# Patient Record
Sex: FEMALE | Race: WHITE | Employment: OTHER | URBAN - METROPOLITAN AREA
[De-identification: names, ages, dates, MRNs, and addresses within clinical notes are randomized per-mention and may not be internally consistent; named-entity substitution may affect disease eponyms.]

---

## 2017-03-08 ENCOUNTER — OP HISTORICAL/CONVERTED ENCOUNTER (OUTPATIENT)
Dept: OTHER | Age: 82
End: 2017-03-08

## 2017-03-14 ENCOUNTER — OP HISTORICAL/CONVERTED ENCOUNTER (OUTPATIENT)
Dept: OTHER | Age: 82
End: 2017-03-14

## 2017-04-01 ENCOUNTER — OP HISTORICAL/CONVERTED ENCOUNTER (OUTPATIENT)
Dept: OTHER | Age: 82
End: 2017-04-01

## 2017-06-08 ENCOUNTER — OFFICE VISIT (OUTPATIENT)
Dept: NEUROLOGY | Age: 82
End: 2017-06-08

## 2017-06-08 VITALS
HEART RATE: 75 BPM | DIASTOLIC BLOOD PRESSURE: 61 MMHG | RESPIRATION RATE: 18 BRPM | TEMPERATURE: 98.6 F | WEIGHT: 161 LBS | SYSTOLIC BLOOD PRESSURE: 117 MMHG | BODY MASS INDEX: 28.53 KG/M2 | OXYGEN SATURATION: 94 % | HEIGHT: 63 IN

## 2017-06-08 DIAGNOSIS — G20 PARKINSON DISEASE (HCC): Primary | ICD-10-CM

## 2017-06-08 NOTE — PROGRESS NOTES
Reviewed record in preparation for visit and have necessary documentation  Pt did not bring medication to office visit for review  Information was given to pt on Advanced Directives, Living Will  opportunity was given for questionsc

## 2017-06-08 NOTE — PROGRESS NOTES
Neurology Consult      Subjective:      Janice Elizabeth is a 80 y.o. female who comes in with idiopathic Parkinson's disease. Has been on a mostly regular schedule of immediate release Sinemet 25/100 1-1/2 4 times a day. If her activity level demands it such as yard work, she will go to 2 tablets in anticipation of the same. Has become aware of her dyskinesias which have been previously mentioned on revisit. Suggested that this was due to the chemistry of the Sinemet when there is relatively too much dopamine around. We could either restrict the dose or add a drug to suppress it such as amantadine. Patient wants to think about the addition of a new drug such as amantadine and not commit to it on this visit. There is a newer drug that has been approved specifically for tardive dyskinesia called valbenazine. Cognition is good and is not using any durable medical equipment. Likes to stay busy in the yard and has had no falls. Has had routinely good checkup since her mastectomy [facility] last December. Revisit 6 months. I briefly mentioned about a stationary bike that she could acquire to keep her legs busy. Did not have any initial enthusiasm about it but will think about it. Otherwise taking an early morning walk and getting sun exposure weather permitting would be a good idea. Current Outpatient Prescriptions   Medication Sig Dispense Refill    raNITIdine (ZANTAC) 150 mg tablet Take 150 mg by mouth two (2) times a day.  ergocalciferol (VITAMIN D2) 50,000 unit capsule Take 50,000 Units by mouth.  carbidopa-levodopa (SINEMET)  mg per tablet 1 and 1/2 po qid 540 Tab 1    aspirin 81 mg tablet Take 81 mg by mouth.           No Known Allergies  Past Medical History:   Diagnosis Date    Arthritis     Asthma     Breast cancer ([facility] [address]       Past Surgical History:   Procedure Laterality Date    HX HYSTERECTOMY      HX KNEE REPLACEMENT      HX MASTECTOMY        Social History Social History    Marital status:      Spouse name: N/A    Number of children: N/A    Years of education: N/A     Occupational History    Not on file. Social History Main Topics    Smoking status: Never Smoker    Smokeless tobacco: Not on file    Alcohol use No    Drug use: Not on file    Sexual activity: Not on file     Other Topics Concern    Not on file     Social History Narrative      Family History   Problem Relation Age of Onset    Cancer Father       Visit Vitals    /61    Pulse 75    Temp 98.6 °F (37 °C) (Oral)    Resp 18    Ht 5' 3\" (1.6 m)    Wt 73 kg (161 lb)    SpO2 94%    BMI 28.52 kg/m2        Review of Systems:   A comprehensive review of systems was negative except for that written in the HPI. Neuro Exam:     Appearance: The patient is well developed, well nourished, provides a coherent history and is in no acute distress. Mental Status: Oriented to time, place and person. Mood and affect appropriate. Cranial Nerves:   Intact visual fields. Fundi are benign. ANITA, EOM's full, no nystagmus, no ptosis. Facial sensation is normal. Corneal reflexes are intact. Facial movement is symmetric. Hearing is normal bilaterally. Palate is midline with normal sternocleidomastoid and trapezius muscles are normal. Tongue is midline. Motor:  5/5 strength in upper and lower proximal and distal muscles. Normal bulk and tone. No fasciculations. Reflexes:   Deep tendon reflexes 2+/4 and symmetrical.   Sensory:   Normal to touch, pinprick and vibration. Gait:   has reasonable transfers and slightly diminished left arm swing appeared to right. Tremor:   No tremor noted. But once again demonstrates random dyskinesias about her limbs upper and lower. Cerebellar:  No cerebellar signs present. Neurovascular:  Normal heart sounds and regular rhythm, peripheral pulses intact, and no carotid bruits. Slight rigidity in the left arm compared to right.   No true bradykinesia seen today. Assessment:   Idiopathic Parkinson's disease. Once again spot dyskinesias which is the feature of the Sinemet. Talked about adding amantadine to check the dyskinesia but patient wants to observe first.  There is also a newer drug called valbenazine which could be considered. Will consider going to to Sinemet every 4 hours to improve her on time especially when she anticipates doing yard work. Has had good checkups on her postmastectomy screens. Revisit 6 months. Plan:   Revisit 6 months.   Signed by :  Bronson Babcock MD

## 2017-06-08 NOTE — MR AVS SNAPSHOT
Visit Information Date & Time Provider Department Dept. Phone Encounter #  
 6/8/2017  9:40 AM Belgica Brannon MD [address] [place] [address] Neurology Clinic [phone] [id number] Follow-up Instructions Return in about 6 months (around 12/8/2017). Follow-up and Disposition History Upcoming Health Maintenance Date Due DTaP/Tdap/Td series (1 - Tdap) 10/14/1954 ZOSTER VACCINE AGE 60> 10/14/1993 GLAUCOMA SCREENING Q2Y 10/14/1998 OSTEOPOROSIS SCREENING (DEXA) 10/14/1998 Pneumococcal 65+ Low/Medium Risk (1 of 2 - PCV13) 10/14/1998 MEDICARE YEARLY EXAM 10/14/1998 INFLUENZA AGE 9 TO ADULT 8/1/2017 Allergies as of 6/8/2017  Review Complete On: 6/8/2017 By: Belgica Brannon MD  
 No Known Allergies Current Immunizations  Never Reviewed No immunizations on file. Not reviewed this visit You Were Diagnosed With   
  
 Codes Comments [facility] ([facility]    -  Primary ICD-10-CM: G20 
ICD-9-CM: 332.0 Vitals BP Pulse Temp Resp Height(growth percentile) Weight(growth percentile)  
 117/61 75 98.6 °F (37 °C) (Oral) 18 5' 3\" (1.6 m) 161 lb (73 kg) SpO2 BMI OB Status Smoking Status 94% 28.52 kg/m2 Menopause Never Smoker Vitals History BMI and BSA Data Body Mass Index Body Surface Area 28.52 kg/m 2 1.8 m 2 Your Updated Medication List  
  
   
This list is accurate as of: 6/8/17 10:18 AM.  Always use your most recent med list.  
  
  
  
  
 aspirin 81 mg tablet Take 81 mg by mouth.  
  
 carbidopa-levodopa  mg per tablet Commonly known as:  SINEMET  
1 and 1/2 po qid VITAMIN D2 50,000 unit capsule Generic drug:  ergocalciferol Take 50,000 Units by mouth. ZANTAC 150 mg tablet Generic drug:  raNITIdine Take 150 mg by mouth two (2) times a day. Follow-up Instructions Return in about 6 months (around 12/8/2017). Patient Instructions Larisa Terrell 17[address] What is a living will? A living will is a legal form you use to write down the kind of care you want at the end of your life. It is used by the health professionals who will treat you if you aren't able to decide for yourself. If you put your wishes in writing, your loved ones and others will know what kind of care you want. They won't need to guess. This can ease your mind and be helpful to others. A living will is not the same as an estate or property will. An estate will explains what you want to happen with your money and property after you die. Is a living will a legal document? A living will is a legal document. Each state has its own laws about living kc. If you move to another state, make sure that your living will is legal in the state where you now live. Or you might use a universal form that has been approved by many states. This kind of form can sometimes be completed and stored online. Your electronic copy will then be available wherever you have a connection to the Internet. In most cases, doctors will respect your wishes even if you have a form from a different state. · You don't need an  to complete a living will. But legal advice can be helpful if your state's laws are unclear, your health history is complicated, or your family can't agree on what should be in your living will. · You can change your living will at any time. Some people find that their wishes about end-of-life care change as their health changes. · In addition to making a living will, think about completing a medical power of  form. This form lets you name the person you want to make end-of-life treatment decisions for you (your \"health care agent\") if you're not able to. Many hospitals and nursing homes will give you the forms you need to complete a living will and a medical power of .  
· Your living will is used only if you can't make or communicate decisions for yourself anymore. If you become able to make decisions again, you can accept or refuse any treatment, no matter what you wrote in your living will. · Your state may offer an online registry. This is a place where you can store your living will online so the doctors and nurses who need to treat you can find it right away. What should you think about when creating a living will? Talk about your end-of-life wishes with your family members and your doctor. Let them know what you want. That way the people making decisions for you won't be surprised by your choices. Think about these questions as you make your living will: · Do you know enough about life support methods that might be used? If not, talk to your doctor so you know what might be done if you can't breathe on your own, your heart stops, or you're unable to swallow. · What things would you still want to be able to do after you receive life-support methods? Would you want to be able to walk? To speak? To eat on your own? To live without the help of machines? · If you have a choice, where do you want to be cared for? In your home? At a hospital or nursing home? · Do you want certain Sikhism practices performed if you become very ill? · If you have a choice at the end of your life, where would you prefer to die? At home? In a hospital or nursing home? Somewhere else? · Would you prefer to be buried or cremated? · Do you want your organs to be donated after you die? What should you do with your living will? · Make sure that your family members and your health care agent have copies of your living will. · Give your doctor a copy of your living will to keep in your medical record. If you have more than one doctor, make sure that each one has a copy. · You may want to put a copy of your living will where it can be easily found. Where can you learn more? Go to http://mychal-lily.info/. Enter B419 in the search box to learn more about \"Learning About Living Josee Venegas. \" Current as of: February 24, 2016 Content Version: 11.2 © 3879-4885 [employer]. Care instructions adapted under license by [employer] (which disclaims liability or warranty for this information). If you have questions about a medical condition or this instruction, always ask your healthcare professional. [address] any warranty or liability for your use of this information. Patient exam shows once again some additional movement about the limbs which is a feature of the Sinemet. Could be challenged with a drug called amantadine but patient wants to observe for now. On the Sinemet could go to a dedicated two every 4 hour schedule. Encouraged to take regular walks especially in the earlier part of the day when sun exposure would be helpful as well. Revisit 6 months. Patient Instructions History Introducing [facility]! [facility] introduces [employer] patient portal. Now you can access parts of your medical record, email your doctor's office, and request medication refills online. 1. In your internet browser, go to [email or web address] 2. Click on the First Time User? Click Here link in the Sign In box. You will see the New Member Sign Up page. 3. Enter your [employer] Access Code exactly as it appears below. You will not need to use this code after youve completed the sign-up process. If you do not sign up before the expiration date, you must request a new code. · [employer] Access Code: [email or web address] Expires: 9/6/2017 10:09 AM 
 
4. Enter the last four digits of your Social Security Number (xxxx) and Date of Birth (mm/dd/yyyy) as indicated and click Submit. You will be taken to the next sign-up page. 5. Create a [employer] ID. This will be your [employer] login ID and cannot be changed, so think of one that is secure and easy to remember. 6. Create a [employer] password. You can change your password at any time. 7. Enter your Password Reset Question and Answer. This can be used at a later time if you forget your password. 8. Enter your e-mail address. You will receive e-mail notification when new information is available in [address] 9. Click Sign Up. You can now view and download portions of your medical record. 10. Click the Download Summary menu link to download a portable copy of your medical information. If you have questions, please visit the Frequently Asked Questions section of the [employer] website. Remember, [employer] is NOT to be used for urgent needs. For medical emergencies, dial 911. Now available from your iPhone and Android! Please provide this summary of care documentation to your next provider. Your primary care clinician is listed as Karen Garner. If you have any questions after today's visit, please call [phone].

## 2017-06-08 NOTE — PATIENT INSTRUCTIONS
Learning About Living Vivien  What is a living will? A living will is a legal form you use to write down the kind of care you want at the end of your life. It is used by the health professionals who will treat you if you aren't able to decide for yourself. If you put your wishes in writing, your loved ones and others will know what kind of care you want. They won't need to guess. This can ease your mind and be helpful to others. A living will is not the same as an estate or property will. An estate will explains what you want to happen with your money and property after you die. Is a living will a legal document? A living will is a legal document. Each state has its own laws about living kc. If you move to another state, make sure that your living will is legal in the state where you now live. Or you might use a universal form that has been approved by many states. This kind of form can sometimes be completed and stored online. Your electronic copy will then be available wherever you have a connection to the Internet. In most cases, doctors will respect your wishes even if you have a form from a different state. · You don't need an  to complete a living will. But legal advice can be helpful if your state's laws are unclear, your health history is complicated, or your family can't agree on what should be in your living will. · You can change your living will at any time. Some people find that their wishes about end-of-life care change as their health changes. · In addition to making a living will, think about completing a medical power of  form. This form lets you name the person you want to make end-of-life treatment decisions for you (your \"health care agent\") if you're not able to. Many hospitals and nursing homes will give you the forms you need to complete a living will and a medical power of .   · Your living will is used only if you can't make or communicate decisions for yourself anymore. If you become able to make decisions again, you can accept or refuse any treatment, no matter what you wrote in your living will. · Your state may offer an online registry. This is a place where you can store your living will online so the doctors and nurses who need to treat you can find it right away. What should you think about when creating a living will? Talk about your end-of-life wishes with your family members and your doctor. Let them know what you want. That way the people making decisions for you won't be surprised by your choices. Think about these questions as you make your living will:  · Do you know enough about life support methods that might be used? If not, talk to your doctor so you know what might be done if you can't breathe on your own, your heart stops, or you're unable to swallow. · What things would you still want to be able to do after you receive life-support methods? Would you want to be able to walk? To speak? To eat on your own? To live without the help of machines? · If you have a choice, where do you want to be cared for? In your home? At a hospital or nursing home? · Do you want certain Roman Catholic practices performed if you become very ill? · If you have a choice at the end of your life, where would you prefer to die? At home? In a hospital or nursing home? Somewhere else? · Would you prefer to be buried or cremated? · Do you want your organs to be donated after you die? What should you do with your living will? · Make sure that your family members and your health care agent have copies of your living will. · Give your doctor a copy of your living will to keep in your medical record. If you have more than one doctor, make sure that each one has a copy. · You may want to put a copy of your living will where it can be easily found. Where can you learn more? Go to http://mychal-lily.info/.   Enter P115 in the search box to learn more about \"Learning About Living Perromedhat. \"  Current as of: February 24, 2016  Content Version: 11.2  © 5622-8135 [employer]. Care instructions adapted under license by [employer] (which disclaims liability or warranty for this information). If you have questions about a medical condition or this instruction, always ask your healthcare professional. [address] any warranty or liability for your use of this information. Patient exam shows once again some additional movement about the limbs which is a feature of the Sinemet. Could be challenged with a drug called amantadine but patient wants to observe for now. On the Sinemet could go to a dedicated two every 4 hour schedule. Encouraged to take regular walks especially in the earlier part of the day when sun exposure would be helpful as well. Revisit 6 months.

## 2017-07-10 ENCOUNTER — TELEPHONE (OUTPATIENT)
Dept: NEUROLOGY | Age: 82
End: 2017-07-10

## 2017-07-10 DIAGNOSIS — G20 PARALYSIS AGITANS (HCC): ICD-10-CM

## 2017-07-10 RX ORDER — CARBIDOPA AND LEVODOPA 25; 100 MG/1; MG/1
TABLET ORAL
Qty: 540 TAB | Refills: 3 | Status: SHIPPED | OUTPATIENT
Start: 2017-07-10 | End: 2018-06-21 | Stop reason: DRUGHIGH

## 2017-12-21 ENCOUNTER — OFFICE VISIT (OUTPATIENT)
Dept: NEUROLOGY | Age: 82
End: 2017-12-21

## 2017-12-21 VITALS
RESPIRATION RATE: 18 BRPM | SYSTOLIC BLOOD PRESSURE: 124 MMHG | DIASTOLIC BLOOD PRESSURE: 80 MMHG | WEIGHT: 160 LBS | TEMPERATURE: 98.2 F | BODY MASS INDEX: 28.35 KG/M2 | OXYGEN SATURATION: 96 % | HEART RATE: 78 BPM | HEIGHT: 63 IN

## 2017-12-21 DIAGNOSIS — G20 PARKINSON DISEASE (HCC): Primary | ICD-10-CM

## 2017-12-21 RX ORDER — CARBIDOPA AND LEVODOPA 25; 100 MG/1; MG/1
TABLET ORAL
Qty: 240 TAB | Refills: 6 | Status: SHIPPED | OUTPATIENT
Start: 2017-12-21 | End: 2018-12-20 | Stop reason: SDUPTHER

## 2017-12-21 NOTE — PROGRESS NOTES
Neurology Consult      Subjective:      Mirela Quigley is a 80 y.o. female who comes in with established parkinson's . On 1 1/2 tabs sinemet IR qid. Will increase to 2 po qid to counter tremors and such. Consider Gocovir for dyskinesias in future. Sleep ok although occasions of waking up and is not sure of basis? Will observe, and I can add sinemet control release qhs if interested. Cognition good and socializes. Gets outside when she can. Not depressed at this point. Exam looked baseline today for me. Has breakthrough dyskinesias of UE noteworthy and not rate limiting at this juncture. Not defaulting to cane etc... Will suggest RV in 6 months and very focused and lively today. Mentioned no new med/surg issues with me. Current Outpatient Prescriptions   Medication Sig Dispense Refill    carbidopa-levodopa (SINEMET)  mg per tablet 2 po qid  Indications: IDIOPATHIC PARKINSONISM 240 Tab 6    carbidopa-levodopa (SINEMET)  mg per tablet 1 and 1/2 po qid 540 Tab 3    raNITIdine (ZANTAC) 150 mg tablet Take 150 mg by mouth two (2) times a day.  aspirin 81 mg tablet Take 81 mg by mouth.  ergocalciferol (VITAMIN D2) 50,000 unit capsule Take 50,000 Units by mouth. No Known Allergies  Past Medical History:   Diagnosis Date    Arthritis     Asthma     Breast cancer ([facility] [address]       Past Surgical History:   Procedure Laterality Date    HX HYSTERECTOMY      HX KNEE REPLACEMENT      HX MASTECTOMY        Social History     Social History    Marital status:      Spouse name: N/A    Number of children: N/A    Years of education: N/A     Occupational History    Not on file.      Social History Main Topics    Smoking status: Never Smoker    Smokeless tobacco: Never Used    Alcohol use No    Drug use: Not on file    Sexual activity: Not on file     Other Topics Concern    Not on file     Social History Narrative      Family History   Problem Relation Age of Onset    Cancer Father       Visit Vitals    /80    Pulse 78    Temp 98.2 °F (36.8 °C) (Oral)    Resp 18    Ht 5' 3\" (1.6 m)    Wt 72.6 kg (160 lb)    SpO2 96%    BMI 28.34 kg/m2        Review of Systems:   A comprehensive review of systems was negative except for that written in the HPI. Neuro Exam:     Appearance: The patient is well developed, well nourished, provides a coherent history and is in no acute distress. Mental Status: Oriented to time, place and person. Mood and affect appropriate. Cranial Nerves:   Intact visual fields. Fundi are benign. ANITA, EOM's full, no nystagmus, no ptosis. Facial sensation is normal. Corneal reflexes are intact. Facial movement is symmetric. Hearing is normal bilaterally. Palate is midline with normal sternocleidomastoid and trapezius muscles are normal. Tongue is midline. Motor:  5/5 strength in upper and lower proximal and distal muscles. Normal bulk and tone. No fasciculations. Reflexes:   Deep tendon reflexes 2+/4 and symmetrical.   Sensory:   Normal to touch, pinprick and vibration. Gait:  Normal gait. Tremor:   Mild rest tremor noted. Cerebellar:  No cerebellar signs present. Neurovascular:  Normal heart sounds and regular rhythm, peripheral pulses intact, and no carotid bruits. Some elements of dyskinesia noteworthy in UE's            Assessment:   parkinsons disease. Will increase sinemet to counter tremors. Consider Gocovir for dyskinesia for future reference. She will let me know if interested in control release sinemet for qhs. Plan:   RV 6 months.   Signed by :  Darshan Cantor MD

## 2017-12-21 NOTE — MR AVS SNAPSHOT
Visit Information Date & Time Provider Department Dept. Phone Encounter #  
 12/21/2017  9:40 AM Syl Andrew MD [address] [place] [address] Neurology Clinic [phone] [id number] Follow-up Instructions Return in about 6 months (around 6/21/2018). Upcoming Health Maintenance Date Due DTaP/Tdap/Td series (1 - Tdap) 10/14/1954 ZOSTER VACCINE AGE 60> 8/14/1993 GLAUCOMA SCREENING Q2Y 10/14/1998 OSTEOPOROSIS SCREENING (DEXA) 10/14/1998 Pneumococcal 65+ Low/Medium Risk (1 of 2 - PCV13) 10/14/1998 MEDICARE YEARLY EXAM 10/14/1998 Influenza Age 5 to Adult 8/1/2017 Allergies as of 12/21/2017  Review Complete On: 12/21/2017 By: Syl Andrew MD  
 No Known Allergies Current Immunizations  Never Reviewed No immunizations on file. Not reviewed this visit You Were Diagnosed With   
  
 Codes Comments [facility] ([facility]    -  Primary ICD-10-CM: G20 
ICD-9-CM: 332.0 Vitals BP Pulse Temp Resp Height(growth percentile) Weight(growth percentile) 124/80 78 98.2 °F (36.8 °C) (Oral) 18 5' 3\" (1.6 m) 160 lb (72.6 kg) SpO2 BMI OB Status Smoking Status 96% 28.34 kg/m2 Menopause Never Smoker Vitals History BMI and BSA Data Body Mass Index Body Surface Area  
 28.34 kg/m 2 1.8 m 2 Preferred Pharmacy Pharmacy Name Phone [facility] [address] [facility] [phone] Your Updated Medication List  
  
   
This list is accurate as of: 12/21/17 10:15 AM.  Always use your most recent med list.  
  
  
  
  
 aspirin 81 mg tablet Take 81 mg by mouth. * carbidopa-levodopa  mg per tablet Commonly known as:  SINEMET  
1 and 1/2 po qid * carbidopa-levodopa  mg per tablet Commonly known as:  SINEMET  
2 po qid  Indications: IDIOPATHIC PARKINSONISM  
  
 VITAMIN D2 50,000 unit capsule Generic drug:  ergocalciferol Take 50,000 Units by mouth. ZANTAC 150 mg tablet Generic drug:  raNITIdine Take 150 mg by mouth two (2) times a day. * Notice: This list has 2 medication(s) that are the same as other medications prescribed for you. Read the directions carefully, and ask your doctor or other care provider to review them with you. Prescriptions Printed Refills  
 carbidopa-levodopa (SINEMET)  mg per tablet 6 Si po qid  Indications: IDIOPATHIC PARKINSONISM Class: Print Follow-up Instructions Return in about 6 months (around 2018). Patient Instructions PRESCRIPTION REFILL POLICY [facility] Statement to Patients 2014 In an effort to ensure the large volume of patient prescription refills is processed in the most efficient and expeditious manner, we are asking our patients to assist us by calling your Pharmacy for all prescription refills, this will include also your  Mail Order Pharmacy. The pharmacy will contact our office electronically to continue the refill process. Please do not wait until the last minute to call your pharmacy. We need at least 48 hours (2days) to fill prescriptions. We also encourage you to call your pharmacy before going to  your prescription to make sure it is ready. With regard to controlled substance prescription refill requests (narcotic refills) that need to be picked up at our office, we ask your cooperation by providing us with at least 72 hours (3days) notice that you will need a refill. We will not refill narcotic prescription refill requests after 4:00pm on any weekday, Monday through Thursday, or after 2:00pm on , or on the weekends.   
  
We encourage everyone to explore another way of getting your prescription refill request processed using [employer]ority, our patient web portal through our electronic medical record system. [employer] is an efficient and effective way to communicate your medication request directly to the office and  downloadable as an ifeoma on your smart phone . [employer] also features a review functionality that allows you to view your medication list as well as leave messages for your physician. Are you ready to get connected? If so please review the attatched instructions or speak to any of our staff to get you set up right away! Thank you so much for your cooperation. Should you have any questions please contact our Practice Administrator. The Physicians and Staff,  [facility] Larisa Pablo Terrell [address] What is a living will? A living will is a legal form you use to write down the kind of care you want at the end of your life. It is used by the health professionals who will treat you if you aren't able to decide for yourself. If you put your wishes in writing, your loved ones and others will know what kind of care you want. They won't need to guess. This can ease your mind and be helpful to others. A living will is not the same as an estate or property will. An estate will explains what you want to happen with your money and property after you die. Is a living will a legal document? A living will is a legal document. Each state has its own laws about living kc. If you move to another state, make sure that your living will is legal in the state where you now live. Or you might use a universal form that has been approved by many states. This kind of form can sometimes be completed and stored online. Your electronic copy will then be available wherever you have a connection to the Internet. In most cases, doctors will respect your wishes even if you have a form from a different state. · You don't need an  to complete a living will.  But legal advice can be helpful if your state's laws are unclear, your health history is complicated, or your family can't agree on what should be in your living will. · You can change your living will at any time. Some people find that their wishes about end-of-life care change as their health changes. · In addition to making a living will, think about completing a medical power of  form. This form lets you name the person you want to make end-of-life treatment decisions for you (your \"health care agent\") if you're not able to. Many hospitals and nursing homes will give you the forms you need to complete a living will and a medical power of . · Your living will is used only if you can't make or communicate decisions for yourself anymore. If you become able to make decisions again, you can accept or refuse any treatment, no matter what you wrote in your living will. · Your state may offer an online registry. This is a place where you can store your living will online so the doctors and nurses who need to treat you can find it right away. What should you think about when creating a living will? Talk about your end-of-life wishes with your family members and your doctor. Let them know what you want. That way the people making decisions for you won't be surprised by your choices. Think about these questions as you make your living will: · Do you know enough about life support methods that might be used? If not, talk to your doctor so you know what might be done if you can't breathe on your own, your heart stops, or you're unable to swallow. · What things would you still want to be able to do after you receive life-support methods? Would you want to be able to walk? To speak? To eat on your own? To live without the help of machines? · If you have a choice, where do you want to be cared for? In your home? At a hospital or nursing home? · Do you want certain Anabaptist practices performed if you become very ill? · If you have a choice at the end of your life, where would you prefer to die? At home? In a hospital or nursing home? Somewhere else? · Would you prefer to be buried or cremated? · Do you want your organs to be donated after you die? What should you do with your living will? · Make sure that your family members and your health care agent have copies of your living will. · Give your doctor a copy of your living will to keep in your medical record. If you have more than one doctor, make sure that each one has a copy. · You may want to put a copy of your living will where it can be easily found. Where can you learn more? Go to http://mychal-lily.info/. Enter T312 in the search box to learn more about \"Learning About Living Perroy. \" Current as of: September 24, 2016 Content Version: 11.4 © 0303-0901 [employer]. Care instructions adapted under license by [employer] (which disclaims liability or warranty for this information). If you have questions about a medical condition or this instruction, always ask your healthcare professional. [address] any warranty or liability for your use of this information. Patient history reviewed and examined. Will increase sinemet to 2 4x a day. Can use extended release if she finds symptoms awaken her at night. RV in 6 months. Introducing [facility] & [facility]! Caitlyn Mccormick introduces [employer] patient portal. Now you can access parts of your medical record, email your doctor's office, and request medication refills online. 1. In your internet browser, go to [email or web address] 2. Click on the First Time User? Click Here link in the Sign In box. You will see the New Member Sign Up page. 3. Enter your [employer] Access Code exactly as it appears below. You will not need to use this code after youve completed the sign-up process.  If you do not sign up before the expiration date, you must request a new code. · [employer] Access Code: [email or web address] Expires: 3/21/2018  9:32 AM 
 
4. Enter the last four digits of your Social Security Number (xxxx) and Date of Birth (mm/dd/yyyy) as indicated and click Submit. You will be taken to the next sign-up page. 5. Create a Cup[employer]ia ID. This will be your [employer] login ID and cannot be changed, so think of one that is secure and easy to remember. 6. Create a [employer] password. You can change your password at any time. 7. Enter your Password Reset Question and Answer. This can be used at a later time if you forget your password. 8. Enter your e-mail address. You will receive e-mail notification when new information is available in [address] 9. Click Sign Up. You can now view and download portions of your medical record. 10. Click the Download Summary menu link to download a portable copy of your medical information. If you have questions, please visit the Frequently Asked Questions section of the [employer] website. Remember, [employer] is NOT to be used for urgent needs. For medical emergencies, dial 911. Now available from your iPhone and Android! Please provide this summary of care documentation to your next provider. Your primary care clinician is listed as Kamillaence Shayneing. If you have any questions after today's visit, please call [phone].

## 2018-06-21 ENCOUNTER — OFFICE VISIT (OUTPATIENT)
Dept: NEUROLOGY | Age: 83
End: 2018-06-21

## 2018-06-21 VITALS
RESPIRATION RATE: 18 BRPM | BODY MASS INDEX: 28 KG/M2 | WEIGHT: 158 LBS | OXYGEN SATURATION: 96 % | HEART RATE: 82 BPM | HEIGHT: 63 IN

## 2018-06-21 DIAGNOSIS — G20 PARKINSON DISEASE (HCC): Primary | ICD-10-CM

## 2018-06-21 NOTE — MR AVS SNAPSHOT
[address] [place] [address] 
[phone] Patient: Jesse Madrigal MRN: [id number] :10/14/1933 Visit Information Date & Time Provider Department Dept. Phone Encounter #  
 2018  9:40 AM Harsh Ramirez MD [facility] [phone] [id number] Follow-up Instructions Return in about 6 months (around 2018). Follow-up and Disposition History Upcoming Health Maintenance Date Due DTaP/Tdap/Td series (1 - Tdap) 10/14/1954 ZOSTER VACCINE AGE 60> 1993 GLAUCOMA SCREENING Q2Y 10/14/1998 Bone Densitometry (Dexa) Screening 10/14/1998 Pneumococcal 65+ Low/Medium Risk (1 of 2 - PCV13) 10/14/1998 MEDICARE YEARLY EXAM 3/14/2018 Influenza Age 5 to Adult 2018 Allergies as of 2018  Review Complete On: 2018 By: Harsh Ramirez MD  
 No Known Allergies Current Immunizations  Never Reviewed No immunizations on file. Not reviewed this visit You Were Diagnosed With   
  
 Codes Comments [facility]    -  Primary ICD-10-CM: G20 
ICD-9-CM: 332.0 Vitals Pulse Resp Height(growth percentile) Weight(growth percentile) SpO2 BMI  
 82 18 5' 3\" (1.6 m) 158 lb (71.7 kg) 96% 27.99 kg/m2 OB Status Smoking Status Menopause Never Smoker Vitals History BMI and BSA Data Body Mass Index Body Surface Area  
 27.99 kg/m 2 1.79 m 2 Preferred Pharmacy Pharmacy Name Phone [facility] [address] [facility] [phone] Your Updated Medication List  
  
   
This list is accurate as of 18 10:21 AM.  Always use your most recent med list.  
  
  
  
  
 aspirin 81 mg tablet Take 81 mg by mouth.  
  
 carbidopa-levodopa  mg per tablet Commonly known as:  SINEMET  
 2 po qid  Indications: IDIOPATHIC PARKINSONISM  
  
 VITAMIN D2 50,000 unit capsule Generic drug:  ergocalciferol Take 50,000 Units by mouth. ZANTAC 150 mg tablet Generic drug:  raNITIdine Take 150 mg by mouth two (2) times a day. Follow-up Instructions Return in about 6 months (around 12/21/2018). Patient Instructions Patient history reviewed and patient examined. Will recommend continuation of Sinemet and will not alter the dose. Do recommend if the weather and circumstances allow, to get outside and soak up some sun to help invigorate her during the day and perhaps make sleep a more pleasant process. This also would help the cognition as well. Revisit 6 months. Patient Instructions History Introducing [facility] & [facility]! Manju Liu introduces [employer] patient portal. Now you can access parts of your medical record, email your doctor's office, and request medication refills online. 1. In your internet browser, go to [email or web address] 2. Click on the First Time User? Click Here link in the Sign In box. You will see the New Member Sign Up page. 3. Enter your [employer] Access Code exactly as it appears below. You will not need to use this code after youve completed the sign-up process. If you do not sign up before the expiration date, you must request a new code. · [employer] Access Code: [email or web address] Expires: 9/19/2018  9:54 AM 
 
4. Enter the last four digits of your Social Security Number (xxxx) and Date of Birth (mm/dd/yyyy) as indicated and click Submit. You will be taken to the next sign-up page. 5. Create a [employer] ID. This will be your [employer] login ID and cannot be changed, so think of one that is secure and easy to remember. 6. Create a [employer] password. You can change your password at any time. 7. Enter your Password Reset Question and Answer. This can be used at a later time if you forget your password. 8. Enter your e-mail address. You will receive e-mail notification when new information is available in [address] 9. Click Sign Up. You can now view and download portions of your medical record. 10. Click the Download Summary menu link to download a portable copy of your medical information. If you have questions, please visit the Frequently Asked Questions section of the [employer] website. Remember, [employer] is NOT to be used for urgent needs. For medical emergencies, dial 911. Now available from your iPhone and Android! Please provide this summary of care documentation to your next provider. Your primary care clinician is listed as Roxanne Betancur. If you have any questions after today's visit, please call [phone].

## 2018-06-21 NOTE — PROGRESS NOTES
Neurology Consult      Subjective:      Mike Francois is a 80 y.o. female who comes in today with established Parkinson's. Is on Sinemet immediate release 25/100 1-1/2 4 times a day. Tolerates it well and I do not hear anything about sedation orthostatic hypotension delusions hallucinations peripheral edema etc.  She has her usual and customary breakthrough dyskinesia but is really not interested in any type of intervention and once again I mentioned to her about the new medicine Gocovri to challenge dyskinesia. Says she really does not do any driving per se at this defaults to her son who lives with her. Has carefully avoided any falls but she is very smart and knows how to be proactive circumstances that can promote loss of balance and similar issues. I encouraged her to get outside weather permitting and circumstances of course to soak up some sun and this could help charge her battery so to speak and help with cognition as well. This might promote more restful sleep at night as well. I infer that she had a recent UTI that was treated. Says her cognition is good as his mood and behavior. Her daughter asked her to intern ask me about medicinal marijuana for Parkinson's intervention. I have no credible literature that I know of and certainly no American Academy of neurology consensus statement to that effect. Seems to be pretty much at her baseline and I will see her back in 6 months. Today she had overall minimal rigidity and I honestly did not detect any bradykinesia. Has a great motivational attitude and I hope that stays. Revisit 6 months. Current Outpatient Prescriptions   Medication Sig Dispense Refill    carbidopa-levodopa (SINEMET)  mg per tablet 2 po qid  Indications: IDIOPATHIC PARKINSONISM 240 Tab 6    raNITIdine (ZANTAC) 150 mg tablet Take 150 mg by mouth two (2) times a day.  ergocalciferol (VITAMIN D2) 50,000 unit capsule Take 50,000 Units by mouth.       aspirin 81 mg tablet Take 81 mg by mouth. No Known Allergies  Past Medical History:   Diagnosis Date    Arthritis     Asthma     Breast cancer ([address]       Past Surgical History:   Procedure Laterality Date    HX HYSTERECTOMY      HX KNEE REPLACEMENT      HX MASTECTOMY        Social History     Social History    Marital status:      Spouse name: N/A    Number of children: N/A    Years of education: N/A     Occupational History    Not on file. Social History Main Topics    Smoking status: Never Smoker    Smokeless tobacco: Never Used    Alcohol use No    Drug use: Not on file    Sexual activity: Not on file     Other Topics Concern    Not on file     Social History Narrative      Family History   Problem Relation Age of Onset    Cancer Father       Visit Vitals    Pulse 82    Resp 18    Ht 5' 3\" (1.6 m)    Wt 71.7 kg (158 lb)    SpO2 96%    BMI 27.99 kg/m2        Review of Systems:   A comprehensive review of systems was negative except for that written in the HPI. Neuro Exam:     Appearance: The patient is well developed, well nourished, provides a coherent history and is in no acute distress. Mental Status: Oriented to time, place and person. Mood and affect appropriate. Cranial Nerves:   Intact visual fields. Fundi are benign. ANITA, EOM's full, no nystagmus, no ptosis. Facial sensation is normal. Corneal reflexes are intact. Facial movement is symmetric. Hearing is normal bilaterally. Palate is midline with normal sternocleidomastoid and trapezius muscles are normal. Tongue is midline. Motor:  5/5 strength in upper and lower proximal and distal muscles. Normal bulk and tone. No fasciculations. Reflexes:   Deep tendon reflexes 1-2+/4 and symmetrical.   Sensory:   Normal to touch, pinprick and vibration. Gait:  Normal gait but cautious and slow. .   Tremor:   No tremor noted. Mild rigidity noted. No real bradykinesia seen.    Cerebellar:  No cerebellar signs present. Neurovascular:  Normal heart sounds and regular rhythm, peripheral pulses intact, and no carotid bruits. As before I could see escaping dyskinesia manifest as trunk and limb movements as she sat without distractions. Assessment:   Established Parkinson's disease. Will continue Sinemet as is. Suggested weather and circumstances permitting, she should try to get outside and soak up some sun to reinvigorate herself during the day and ensure more restful sleep at night. It probably helps with cognition and mood and behavior as well. Please see history of present illness. Plan:   Revisit 6 months.   Signed by :  Nellie Sandoval MD

## 2018-06-21 NOTE — PATIENT INSTRUCTIONS
Patient history reviewed and patient examined. Will recommend continuation of Sinemet and will not alter the dose. Do recommend if the weather and circumstances allow, to get outside and soak up some sun to help invigorate her during the day and perhaps make sleep a more pleasant process. This also would help the cognition as well. Revisit 6 months.

## 2018-12-20 ENCOUNTER — OFFICE VISIT (OUTPATIENT)
Dept: NEUROLOGY | Age: 83
End: 2018-12-20

## 2018-12-20 VITALS
RESPIRATION RATE: 18 BRPM | WEIGHT: 158 LBS | SYSTOLIC BLOOD PRESSURE: 123 MMHG | DIASTOLIC BLOOD PRESSURE: 68 MMHG | HEIGHT: 63 IN | OXYGEN SATURATION: 97 % | BODY MASS INDEX: 28 KG/M2 | HEART RATE: 77 BPM

## 2018-12-20 DIAGNOSIS — G20 PARKINSON DISEASE (HCC): Primary | ICD-10-CM

## 2018-12-20 RX ORDER — CARBIDOPA AND LEVODOPA 25; 100 MG/1; MG/1
TABLET ORAL
Qty: 720 TAB | Refills: 1 | Status: SHIPPED | OUTPATIENT
Start: 2018-12-20 | End: 2019-06-20 | Stop reason: SDUPTHER

## 2018-12-20 NOTE — PROGRESS NOTES
Neurology Consult      Subjective:      Ovidio Holland is a 80 y.o. female is in months me she has had Parkinson's disease since 2004. Is on Sinemet immediate release 25/100 taking to 4 times a day. Sometimes forgets doses but has an elaborate pillbox a light alarm that goes off when the next dose is due. Sometimes basically forgets look at that go back. Has had no falls and relies on her son transportation and other out of house activities. Says mood and behavior is good and likes to interact with her family talked about other options at this point as we have before in terms of the neupro patch apokyn or even a formal referral to [facility]. Politely declines each of these. Prides herself on her memory today I thought was a standing. Does have an element of tardive dyskinesia goes with her peak dose effect. 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 Sinemet renewed by request.  Nicola Pablo to be at her baseline today's exam.         Current Outpatient Medications   Medication Sig Dispense Refill    carbidopa-levodopa (SINEMET)  mg per tablet 2 po qid  Indications: IDIOPATHIC PARKINSONISM 240 Tab 6    raNITIdine (ZANTAC) 150 mg tablet Take 150 mg by mouth two (2) times a day.  ergocalciferol (VITAMIN D2) 50,000 unit capsule Take 50,000 Units by mouth.  aspirin 81 mg tablet Take 81 mg by mouth.           No Known Allergies  Past Medical History:   Diagnosis Date    Arthritis     Asthma     Breast cancer ([address]       Past Surgical History:   Procedure Laterality Date    HX HYSTERECTOMY      HX KNEE REPLACEMENT      HX MASTECTOMY        Social History     Socioeconomic History    Marital status:      Spouse name: Not on file    Number of children: Not on file    Years of education: Not on file    Highest education level: Not on file   Social Needs    Financial resource strain: Not on file    Food insecurity - worry: Not on file    Food insecurity - inability: Not on file    Transportation needs - medical: Not on file   [employer] needs - non-medical: Not on file   Occupational History    Not on file   Tobacco Use    Smoking status: Never Smoker    Smokeless tobacco: Never Used   Substance and Sexual Activity    Alcohol use: No    Drug use: Not on file    Sexual activity: Not on file   Other Topics Concern    Not on file   Social History Narrative    Not on file      Family History   Problem Relation Age of Onset    Cancer Father       Visit Vitals  /68   Pulse 77   Resp 18   Ht 5' 3\" (1.6 m)   Wt 71.7 kg (158 lb)   SpO2 97%   BMI 27.99 kg/m²        Review of Systems:   A comprehensive review of systems was negative except for that written in the HPI. Neuro Exam:     Appearance: The patient is well developed, well nourished, provides a coherent history and is in no acute distress. Mental Status: Oriented to time, place and person. Mood and affect appropriate. Cranial Nerves:   Intact visual fields. Fundi are benign. ANITA, EOM's full, no nystagmus, no ptosis. Facial sensation is normal. Corneal reflexes are intact. Facial movement is symmetric. Hearing is normal bilaterally. Palate is midline with normal sternocleidomastoid and trapezius muscles are normal. Tongue is midline. Motor:  5/5 strength in upper and lower proximal and distal muscles. Normal bulk and tone. No fasciculations. Reflexes:   Deep tendon reflexes 2+/4 and symmetrical.   Sensory:   Normal to touch, pinprick and vibration. Gait:   Transfers gait taken cautious but functional.  Does have superimposed tardive dyskinesia. Tremor:   No tremor noted. Cerebellar:  No cerebellar signs present. Neurovascular:  Normal heart sounds and regular rhythm, peripheral pulses intact, and no carotid bruits. Did not appreciate any rigidity or true bradykinesia today. Assessment:   Long-standing Parkinson's disease. Doing amazingly well and offered her some additional solutions to dosing declines and also the offer to share her with the [address] [place] [address] at [facility]. Has great cognition self-sufficient. I offered her ability of Apokyn and the Neupro patch. Politely declines. Plan:   Revisit in about 6 months.   Signed by :  Rolf Blancas MD

## 2018-12-20 NOTE — PATIENT INSTRUCTIONS
[facility]   Statement to Patients  April 1, 2014      In an effort to ensure the large volume of patient prescription refills is processed in the most efficient and expeditious manner, we are asking our patients to assist us by calling your Pharmacy for all prescription refills, this will include also your  Mail Order Pharmacy. The pharmacy will contact our office electronically to continue the refill process. Please do not wait until the last minute to call your pharmacy. We need at least 48 hours (2days) to fill prescriptions. We also encourage you to call your pharmacy before going to  your prescription to make sure it is ready. With regard to controlled substance prescription refill requests (narcotic refills) that need to be picked up at our office, we ask your cooperation by providing us with at least 72 hours (3days) notice that you will need a refill. We will not refill narcotic prescription refill requests after 4:00pm on any weekday, Monday through Thursday, or after 2:00pm on Fridays, or on the weekends. We encourage everyone to explore another way of getting your prescription refill request processed using [employer], our patient web portal through our electronic medical record system. [employer] is an efficient and effective way to communicate your medication request directly to the office and  downloadable as an ifeoma on your smart phone . [employer] also features a review functionality that allows you to view your medication list as well as leave messages for your physician. Are you ready to get connected? If so please review the attatched instructions or speak to any of our staff to get you set up right away! Thank you so much for your cooperation. Should you have any questions please contact our Practice Administrator.     The Physicians and Staff,  [facility]                Parkinson's Disease: Care Instructions  Your Care Instructions  Parkinson's disease can cause tremors, stiffness, and problems with movement. Severe or advanced cases can also cause problems with thinking. In Parkinson's disease, part of the brain cannot make enough dopamine, a chemical that helps control movement. Taking your medicines correctly and getting regular exercise may help you maintain your quality of life. There are many things that can cause Parkinson's disease symptoms, including some medicine, some toxins, and trauma to the head. The cause in most cases is not known. Follow-up care is a key part of your treatment and safety. Be sure to make and go to all appointments, and call your doctor if you are having problems. It's also a good idea to know your test results and keep a list of the medicines you take. How can you care for yourself at home? General care  · Take your medicines exactly as prescribed. Call your doctor if you think you are having a problem with your medicine. · Make sure your home is safe:  ? Place furniture so that you have something to hold on to as you walk around the house. ? Use chairs that make it easier to sit down and stand up. ? Group the things you use most, such as reading glasses, keys, and the telephone, in one easy-to-reach place. ? Tack down rugs so that you do not trip. ? Put no-slip tape and handrails in the tub to prevent falls. · Use a cane, walker, or scooter if your doctor suggests it. · Keep up your normal activities as much as you can. · Find ways to manage stress, which can make symptoms worse. · Spend time with family and friends. Join a support group for people with Parkinson's disease if you want extra help. · Depression is common with this condition. Tell your doctor if you have trouble sleeping, are eating too much or are not hungry, or feel sad or tearful all the time. Depression can be treated with medicine and counseling. Diet and exercise  · Eat a balanced diet.   · If you are taking levodopa, do not eat protein at the same time you take your medicine. Levodopa may not work as well if you take it at the same time you eat protein. You can eat normal amounts of protein. Talk to your doctor if you have questions. · If you have problems swallowing, change how and what you eat:  ? Try thick drinks, such as milk shakes. They are easier to swallow than other fluids. ? Do not eat foods that crumble easily. These can cause choking. ? Use a  to prepare food. Soft foods need less chewing. ? Eat small meals often so that you do not get tired from eating heavy meals. · Drink plenty of water and eat a high-fiber diet to prevent constipation. Parkinson's--and the medicines that treat it--may slow your intestines. · Get exercise on most days. Work with your doctor to set up a program of walking, swimming, or other exercise you are able to do. When should you call for help? Call your doctor now or seek immediate medical care if:    · You have a change in your symptoms.     · You develop other problems from your condition, such as:  ? Injury from a fall. ? Thinking or memory problems. ? A urinary tract infection (burning pain when urinating).    Watch closely for changes in your health, and be sure to contact your doctor if:    · You lose weight because of problems with eating.     · You want more information about your condition or your medicines. Where can you learn more? Go to http://mychal-lily.info/. Enter G770 in the search box to learn more about \"Parkinson's Disease: Care Instructions. \"  Current as of: June 4, 2018  Content Version: 11.8  © 2885-5743 [employer]. Care instructions adapted under license by [employer] (which disclaims liability or warranty for this information).  If you have questions about a medical condition or this instruction, always ask your healthcare professional. Rhonda Bearden disclaims any warranty or liability for your use of this information. Learning About Living Vivien  What is a living will? A living will is a legal form you use to write down the kind of care you want at the end of your life. It is used by the health professionals who will treat you if you aren't able to decide for yourself. If you put your wishes in writing, your loved ones and others will know what kind of care you want. They won't need to guess. This can ease your mind and be helpful to others. A living will is not the same as an estate or property will. An estate will explains what you want to happen with your money and property after you die. Is a living will a legal document? A living will is a legal document. Each state has its own laws about living kc. If you move to another state, make sure that your living will is legal in the state where you now live. Or you might use a universal form that has been approved by many states. This kind of form can sometimes be completed and stored online. Your electronic copy will then be available wherever you have a connection to the Internet. In most cases, doctors will respect your wishes even if you have a form from a different state. · You don't need an  to complete a living will. But legal advice can be helpful if your state's laws are unclear, your health history is complicated, or your family can't agree on what should be in your living will. · You can change your living will at any time. Some people find that their wishes about end-of-life care change as their health changes. · In addition to making a living will, think about completing a medical power of  form. This form lets you name the person you want to make end-of-life treatment decisions for you (your \"health care agent\") if you're not able to. Many hospitals and nursing homes will give you the forms you need to complete a living will and a medical power of .   · Your living will is used only if you can't make or communicate decisions for yourself anymore. If you become able to make decisions again, you can accept or refuse any treatment, no matter what you wrote in your living will. · Your state may offer an online registry. This is a place where you can store your living will online so the doctors and nurses who need to treat you can find it right away. What should you think about when creating a living will? Talk about your end-of-life wishes with your family members and your doctor. Let them know what you want. That way the people making decisions for you won't be surprised by your choices. Think about these questions as you make your living will:  · Do you know enough about life support methods that might be used? If not, talk to your doctor so you know what might be done if you can't breathe on your own, your heart stops, or you're unable to swallow. · What things would you still want to be able to do after you receive life-support methods? Would you want to be able to walk? To speak? To eat on your own? To live without the help of machines? · If you have a choice, where do you want to be cared for? In your home? At a hospital or nursing home? · Do you want certain Faith practices performed if you become very ill? · If you have a choice at the end of your life, where would you prefer to die? At home? In a hospital or nursing home? Somewhere else? · Would you prefer to be buried or cremated? · Do you want your organs to be donated after you die? What should you do with your living will? · Make sure that your family members and your health care agent have copies of your living will. · Give your doctor a copy of your living will to keep in your medical record. If you have more than one doctor, make sure that each one has a copy. · You may want to put a copy of your living will where it can be easily found. Where can you learn more?   Go to http://mychal-lily.info/. Enter C510 in the search box to learn more about \"Learning About Living Perromedhat. \"  Current as of: April 19, 2018  Content Version: 11.8  © 9462-7980 Healthwise, [facility]. Care instructions adapted under license by [employer] (which disclaims liability or warranty for this information). If you have questions about a medical condition or this instruction, always ask your healthcare professional. [place] [address] any warranty or liability for your use of this information. Patient history reviewed and patient examined. Stays amazingly spry and 15 years of this disease is amazing. I will not alter the medications again and did offer to share her with the [facility]. At this time politely declines.   See her in 6 months and stay as reasonably and safely busy as possible

## 2019-06-20 ENCOUNTER — OFFICE VISIT (OUTPATIENT)
Dept: NEUROLOGY | Age: 84
End: 2019-06-20

## 2019-06-20 VITALS
BODY MASS INDEX: 27.99 KG/M2 | DIASTOLIC BLOOD PRESSURE: 73 MMHG | RESPIRATION RATE: 20 BRPM | OXYGEN SATURATION: 96 % | HEART RATE: 74 BPM | HEIGHT: 63 IN | SYSTOLIC BLOOD PRESSURE: 110 MMHG

## 2019-06-20 DIAGNOSIS — G20 PARKINSON DISEASE (HCC): Primary | ICD-10-CM

## 2019-06-20 RX ORDER — CARBIDOPA AND LEVODOPA 25; 100 MG/1; MG/1
TABLET ORAL
Qty: 720 TAB | Refills: 1 | Status: SHIPPED | OUTPATIENT
Start: 2019-06-20 | End: 2019-12-19 | Stop reason: SDUPTHER

## 2019-06-20 RX ORDER — LANOLIN ALCOHOL/MO/W.PET/CERES
1000 CREAM (GRAM) TOPICAL DAILY
COMMUNITY

## 2019-06-20 NOTE — PATIENT INSTRUCTIONS
[facility]   Statement to Patients  April 1, 2014      In an effort to ensure the large volume of patient prescription refills is processed in the most efficient and expeditious manner, we are asking our patients to assist us by calling your Pharmacy for all prescription refills, this will include also your  Mail Order Pharmacy. The pharmacy will contact our office electronically to continue the refill process. Please do not wait until the last minute to call your pharmacy. We need at least 48 hours (2days) to fill prescriptions. We also encourage you to call your pharmacy before going to  your prescription to make sure it is ready. With regard to controlled substance prescription refill requests (narcotic refills) that need to be picked up at our office, we ask your cooperation by providing us with at least 72 hours (3days) notice that you will need a refill. We will not refill narcotic prescription refill requests after 4:00pm on any weekday, Monday through Thursday, or after 2:00pm on Fridays, or on the weekends. We encourage everyone to explore another way of getting your prescription refill request processed using Yext, our patient web portal through our electronic medical record system. Yext is an efficient and effective way to communicate your medication request directly to the office and  downloadable as an ifeoma on your smart phone . Yext also features a review functionality that allows you to view your medication list as well as leave messages for your physician. Are you ready to get connected? If so please review the attatched instructions or speak to any of our staff to get you set up right away! Thank you so much for your cooperation. Should you have any questions please contact our Practice Administrator. The Physicians and Staff,  [facility]     Patient history reviewed and patient examined.   Will make suggestion to change her Sinemet dosing schedule to basically every 3 hours 9 12 3 and 6 daily. She should look into home assistive services and apparently she has insurance that would cover that? Stay as mentally and physically engaged as possible and safely so. Revisit 6 months.   Sinemet renewed by request.

## 2019-06-20 NOTE — LETTER
6/20/19 Patient: Jacqueline Birch YOB: 1933 Date of Visit: 6/20/2019 Aleida Cervantes, [address] [address] [address] VIA Facsimile: [phone] Dear Aleida Cervantes DO, Thank you for referring Ms. Joseph Andrade to [address] [place] [address] for evaluation. My notes for this consultation are attached. If you have questions, please do not hesitate to call me. I look forward to following your patient along with you.  
 
 
Sincerely, 
 
Trey Esqueda MD

## 2019-06-20 NOTE — PROGRESS NOTES
Neurology Consult      Subjective:      Claude Reilly is a 80 y.o. female who comes in today with established Parkinson's. Is on carbidopa levodopa immediate release 25/100 4 times daily. We went over her schedule better on a every 3 hours amended schedule. That will look like 912 3 and 6. Granddaughter helps watch over her finances which I think is a great idea. Also has insurance that covers home health assistance I seriously think she should look into that. Has had no falls and denies being depressed although did reference sometimes and in difference to doing this or that. Recent blood work looks quite reasonable medicine will be renewed by request.  Revisit in 6 months. Cognition is good balance bladder function is fine and sleep is reasonable I did mention that if she does frequent napping during the day, it takes away the quality sleep at night. Says she will do better on the score. No psychosis or related features. Current Outpatient Medications   Medication Sig Dispense Refill    cyanocobalamin 1,000 mcg tablet Take 1,000 mcg by mouth daily.  carbidopa-levodopa (SINEMET)  mg per tablet 2 po qid  Indications: Parkinson's Disease 720 Tab 1    ergocalciferol (VITAMIN D2) 50,000 unit capsule Take 50,000 Units by mouth.  aspirin 81 mg tablet Take 81 mg by mouth.  raNITIdine (ZANTAC) 150 mg tablet Take 150 mg by mouth two (2) times a day.         No Known Allergies  Past Medical History:   Diagnosis Date    Arthritis     Asthma     Breast cancer ([facility] [address]       Past Surgical History:   Procedure Laterality Date    HX HYSTERECTOMY      HX KNEE REPLACEMENT      HX MASTECTOMY        Social History     Socioeconomic History    Marital status:      Spouse name: Not on file    Number of children: Not on file    Years of education: Not on file    Highest education level: Not on file   Occupational History    Not on file   Social Needs    Financial resource strain: Not on file    Food insecurity:     Worry: Not on file     Inability: Not on file    Transportation needs:     Medical: Not on file     Non-medical: Not on file   Tobacco Use    Smoking status: Never Smoker    Smokeless tobacco: Never Used   Substance and Sexual Activity    Alcohol use: No    Drug use: Not on file    Sexual activity: Not on file   Lifestyle    Physical activity:     Days per week: Not on file     Minutes per session: Not on file    Stress: Not on file   Relationships    Social connections:     Talks on phone: Not on file     Gets together: Not on file     Attends Hinduism service: Not on file     Active member of club or organization: Not on file     Attends meetings of clubs or organizations: Not on file     Relationship status: Not on file    Intimate partner violence:     Fear of current or ex partner: Not on file     Emotionally abused: Not on file     Physically abused: Not on file     Forced sexual activity: Not on file   Other Topics Concern    Not on file   Social History Narrative    Not on file      Family History   Problem Relation Age of Onset    Cancer Father       Visit Vitals  /73   Pulse 74   Resp 20   Ht 5' 3\" (1.6 m)   SpO2 96%   BMI 27.99 kg/m²        Review of Systems:   A comprehensive review of systems was negative except for that written in the HPI. Neuro Exam:     Appearance: The patient is well developed, well nourished, provides a coherent history and is in no acute distress. Mental Status: Oriented to time, place and person. Mood and affect appropriate. Cranial Nerves:   Intact visual fields. Fundi are benign. ANITA, EOM's full, no nystagmus, no ptosis. Facial sensation is normal. Corneal reflexes are intact. Facial movement is symmetric. Hearing is normal bilaterally. Palate is midline with normal sternocleidomastoid and trapezius muscles are normal. Tongue is midline.    Motor:  5/5 strength in upper and lower proximal and distal muscles. Normal bulk and tone. No fasciculations. Reflexes:   Deep tendon reflexes 2+/4 and symmetrical.   Sensory:   Normal to touch, pinprick and vibration. Gait:  Normal gait but somewhat slower and cautious but functional.   Tremor:   No tremor noted. Does have some tardive dyskinesia which I have seen before. Very animated and I really did not notice bradykinesia. Cerebellar:  No cerebellar signs present. Neurovascular:  Normal heart sounds and regular rhythm, peripheral pulses intact, and no carotid bruits. Assessment:   Parkinson's disease. Exam looks baseline for me and made a simple suggestion that she change her dosing from existing to a 9 12 3 and 6. Also is noticing she would benefit from at home services and apparently she has a insurance policy that would cover that. Granddaughter currently watches her finances says she has lost some weight which she has been beneficial several different areas. Plan:   Revisit 6 months.   Signed by :  José Faye MD

## 2019-12-19 ENCOUNTER — TELEPHONE (OUTPATIENT)
Dept: NEUROLOGY | Age: 84
End: 2019-12-19

## 2019-12-19 ENCOUNTER — OFFICE VISIT (OUTPATIENT)
Dept: NEUROLOGY | Age: 84
End: 2019-12-19

## 2019-12-19 VITALS
RESPIRATION RATE: 20 BRPM | HEIGHT: 63 IN | BODY MASS INDEX: 26.75 KG/M2 | DIASTOLIC BLOOD PRESSURE: 80 MMHG | SYSTOLIC BLOOD PRESSURE: 125 MMHG | HEART RATE: 66 BPM | OXYGEN SATURATION: 98 % | WEIGHT: 151 LBS

## 2019-12-19 DIAGNOSIS — G20 PARKINSON DISEASE (HCC): Primary | ICD-10-CM

## 2019-12-19 DIAGNOSIS — G20 DYSKINESIA DUE TO PARKINSON'S DISEASE (HCC): ICD-10-CM

## 2019-12-19 DIAGNOSIS — G24.9 DYSKINESIA DUE TO PARKINSON'S DISEASE (HCC): ICD-10-CM

## 2019-12-19 RX ORDER — CHOLECALCIFEROL (VITAMIN D3) 125 MCG
CAPSULE ORAL
COMMUNITY

## 2019-12-19 RX ORDER — CARBIDOPA AND LEVODOPA 25; 100 MG/1; MG/1
TABLET ORAL
Qty: 720 TAB | Refills: 1 | Status: SHIPPED | OUTPATIENT
Start: 2019-12-19 | End: 2020-04-10 | Stop reason: SDUPTHER

## 2019-12-19 NOTE — PATIENT INSTRUCTIONS
PRESCRIPTION REFILL POLICY [facility] Statement to Patients April 1, 2014 In an effort to ensure the large volume of patient prescription refills is processed in the most efficient and expeditious manner, we are asking our patients to assist us by calling your Pharmacy for all prescription refills, this will include also your  Mail Order Pharmacy. The pharmacy will contact our office electronically to continue the refill process. Please do not wait until the last minute to call your pharmacy. We need at least 48 hours (2days) to fill prescriptions. We also encourage you to call your pharmacy before going to  your prescription to make sure it is ready. With regard to controlled substance prescription refill requests (narcotic refills) that need to be picked up at our office, we ask your cooperation by providing us with at least 72 hours (3days) notice that you will need a refill. We will not refill narcotic prescription refill requests after 4:00pm on any weekday, Monday through Thursday, or after 2:00pm on Fridays, or on the weekends. We encourage everyone to explore another way of getting your prescription refill request processed using [employer], our patient web portal through our electronic medical record system. [facility] is an efficient and effective way to communicate your medication request directly to the office and  downloadable as an ifeoma on your smart phone . [facility] also features a review functionality that allows you to view your medication list as well as leave messages for your physician. Are you ready to get connected? If so please review the attatched instructions or speak to any of our staff to get you set up right away! Thank you so much for your cooperation. Should you have any questions please contact our Practice Administrator. The Physicians and Staff,  [facility] Patient history reviewed patient examined. We will asked the patient to try cutting back on her Sinemet as I think there may be an inadvertent unintentional movement issue related to the amount of Sinemet that she is on. We will try cutting back from 2 to 1 on each occasion and see how that goes. Stay safe Holly Ruff happy New Year's revisit 6 months.

## 2019-12-19 NOTE — LETTER
12/19/19 Patient: Xiao Frausto YOB: 1933 Date of Visit: 12/19/2019 Mickey Burnett, [address] [address] [address] VIA Facsimile: [phone] Dear Mickey Burnett DO, Thank you for referring Ms. Yusuf Palmer to [facility] for evaluation. My notes for this consultation are attached. If you have questions, please do not hesitate to call me. I look forward to following your patient along with you.  
 
 
Sincerely, 
 
Karyle Lawman, MD

## 2019-12-19 NOTE — PROGRESS NOTES
Neurology Consult      Subjective:      Miachel Gowers is a 80 y.o. female who comes in today with established idiopathic Parkinson's. Is currently taking Sinemet immediate release 25/100-2, 4 times a day. Has noticed the morning is her breast time in terms of movements. Says she may have a. When she gets up of about 2 hours or so before she starts needing the Sinemet to enhance motor performance and gait and transfers. I suggest cutting back to 1 per dosing interval with the Sinemet and see where that goes. Has had no falls. Cognition still appears to be good. We could introduce amantadine to suppress the dyskinesias and I am leery of introducing dopamine agonists given her age and circumstances. I explained to her that for most patients with treatment sensitive Parkinson's that the morning is absolutely the worst time of the day because it is the longest interval between dopamine dosing. I have a feeling the dyskinesias are eclipsing the Parkinson's symptom complex all things considered. I will suggest revisit in 6 months. Does not appear to have any dystonic type features whereby baclofen possibly could be introduced, but such is not the case. Current Outpatient Medications   Medication Sig Dispense Refill    naproxen sodium (ALEVE) 220 mg cap Take  by mouth.  cyanocobalamin 1,000 mcg tablet Take 1,000 mcg by mouth daily.  carbidopa-levodopa (SINEMET)  mg per tablet 2 po qid  Indications: Parkinson's Disease 720 Tab 1    ergocalciferol (VITAMIN D2) 50,000 unit capsule Take 50,000 Units by mouth.  raNITIdine (ZANTAC) 150 mg tablet Take 150 mg by mouth two (2) times a day.  aspirin 81 mg tablet Take 81 mg by mouth.           No Known Allergies  Past Medical History:   Diagnosis Date    Arthritis     Asthma     Breast cancer ([facility] [address]       Past Surgical History:   Procedure Laterality Date    HX HYSTERECTOMY      HX KNEE REPLACEMENT      HX MASTECTOMY Social History     Socioeconomic History    Marital status:      Spouse name: Not on file    Number of children: Not on file    Years of education: Not on file    Highest education level: Not on file   Occupational History    Not on file   Social Needs    Financial resource strain: Not on file    Food insecurity:     Worry: Not on file     Inability: Not on file    Transportation needs:     Medical: Not on file     Non-medical: Not on file   Tobacco Use    Smoking status: Never Smoker    Smokeless tobacco: Never Used   Substance and Sexual Activity    Alcohol use: No    Drug use: Not on file    Sexual activity: Not on file   Lifestyle    Physical activity:     Days per week: Not on file     Minutes per session: Not on file    Stress: Not on file   Relationships    Social connections:     Talks on phone: Not on file     Gets together: Not on file     Attends Mandaen service: Not on file     Active member of club or organization: Not on file     Attends meetings of clubs or organizations: Not on file     Relationship status: Not on file    Intimate partner violence:     Fear of current or ex partner: Not on file     Emotionally abused: Not on file     Physically abused: Not on file     Forced sexual activity: Not on file   Other Topics Concern    Not on file   Social History Narrative    Not on file      Family History   Problem Relation Age of Onset    Cancer Father       Visit Vitals  /80   Pulse 66   Resp 20   Ht 5' 3\" (1.6 m)   Wt 68.5 kg (151 lb)   SpO2 98%   BMI 26.75 kg/m²        Review of Systems:   A comprehensive review of systems was negative except for that written in the HPI. Neuro Exam:     Appearance: The patient is well developed, well nourished, provides a coherent history and is in no acute distress. Mental Status: Oriented to time, place and person. Mood and affect appropriate. Cranial Nerves:   Intact visual fields. Fundi are benign.  ANITA, EOM's full, no nystagmus, no ptosis. Facial sensation is normal. Corneal reflexes are intact. Facial movement is symmetric. Hearing is normal bilaterally. Palate is midline with normal sternocleidomastoid and trapezius muscles are normal. Tongue is midline. Motor:  5/5 strength in upper and lower proximal and distal muscles. Normal bulk and tone. No fasciculations. Reflexes:   Deep tendon reflexes 2+/4 and symmetrical.   Sensory:   Normal to touch, pinprick and vibration. Gait:  Normal gait that is intertwined with her dyskinesias. .   Tremor:   No tremor noted. Does have more obvious dyskinesias that have a choreoathetoid movement about her arms and head and less noticeable in her legs. Did not notice any rigidity or bradykinesia today. Cerebellar:  No cerebellar signs present. Neurovascular:  Normal heart sounds and regular rhythm, peripheral pulses intact, and no carotid bruits. Assessment:   Parkinson's disease. Patient appears to have the ever present appearance of dyskinesias and will make the following recommendations. Try cutting back to 1 Sinemet instead of 2 on each dosing timeframe. Beyond that could consider the introduction of amantadine and I am concerned about introducing dopamine agonist with the patient at this age and circumstances. Stay safe and Merry Speedy and happy new year. Further suggestions may follow. Revisit 6 months. Plan:   Revisit 6 months.   Signed by :  Amara Darnell MD

## 2019-12-19 NOTE — TELEPHONE ENCOUNTER
----- Message from Mirella Eckert sent at 12/19/2019 12:44 PM EST -----  Regarding: Dr Gutierrez/rx refill  Medication Refill    Caller (if not patient):      Relationship of caller (if not patient):      Best contact number(s):[phone]      Name of medication and dosage if known:  carbidopa- levodopa   25-100mg tabs      Is patient out of this medication (yes/no):no has one week supply left      Pharmacy name: [facility] on the [facility]    Pharmacy listed in chart? (yes/no):yes  Pharmacy phone number:[phone]      Details to clarify the request:pt said she was in today and forgot to remind Dr Micah Scott about her rx refill that she needed      Mirella Eckert

## 2020-04-10 RX ORDER — CARBIDOPA AND LEVODOPA 25; 100 MG/1; MG/1
TABLET ORAL
Qty: 720 TAB | Refills: 1 | Status: SHIPPED | OUTPATIENT
Start: 2020-04-10 | End: 2021-01-13 | Stop reason: SDUPTHER

## 2020-07-13 ENCOUNTER — OFFICE VISIT (OUTPATIENT)
Dept: NEUROLOGY | Age: 85
End: 2020-07-13

## 2020-07-13 VITALS
RESPIRATION RATE: 18 BRPM | OXYGEN SATURATION: 97 % | HEIGHT: 62 IN | TEMPERATURE: 96.2 F | HEART RATE: 73 BPM | WEIGHT: 146 LBS | BODY MASS INDEX: 26.87 KG/M2

## 2020-07-13 DIAGNOSIS — G20 PARKINSON DISEASE (HCC): Primary | ICD-10-CM

## 2020-07-13 NOTE — PROGRESS NOTES
Neurology Consult      Subjective:      Evelio Hinson is a 80 y.o. female who comes in today with her daughter. For the last 2+ months she has been living with her and the unfortunate part of the story is her son passed away on Mother's Day and this is the new living arrangement. My condolences regarding the same. Daughter keeps very close eye on her and so far no emergencies or falls or similar considerations. I did asked the patient to notify her daughter when she is outside so that there is careful observation and nothing untoward happens unexpectedly. We talked before about reducing the dose of Sinemet 25/100 from 2 taking 4 times a day to 1 4 times a day. This was in light of the dyskinesias that are ever present in the upper extremities and occasionally the head. At this point in time though she remains on 2 4 times a day. Does not seem to slow her down and and otherwise is inapparent. I guess we will just keep the dosing where it is for now as she likes the mobility and we could run into relative immobility and rigidity and bradykinesia at a lower dose. Thought about adding amantadine or something similar to better control the dyskinesias. She was in a good mood today and I normally see her at the other office location in [place]. She did like being at this office and the daughter did seem to mind bringing her so we can shoot for a revisit in this location rather than at the other one. Revisit 6 months. Current Outpatient Medications   Medication Sig Dispense Refill    carbidopa-levodopa (Sinemet)  mg per tablet 2 po qid  Indications: Parkinson's disease 720 Tab 1    naproxen sodium (ALEVE) 220 mg cap Take  by mouth.  cyanocobalamin 1,000 mcg tablet Take 1,000 mcg by mouth daily.  raNITIdine (ZANTAC) 150 mg tablet Take 150 mg by mouth two (2) times a day.  ergocalciferol (VITAMIN D2) 50,000 unit capsule Take 50,000 Units by mouth.       aspirin 81 mg tablet Take 81 mg by mouth. No Known Allergies  Past Medical History:   Diagnosis Date    Arthritis     Asthma     Breast cancer ([address]       Past Surgical History:   Procedure Laterality Date    HX HYSTERECTOMY      HX KNEE REPLACEMENT      HX MASTECTOMY        Social History     Socioeconomic History    Marital status:      Spouse name: Not on file    Number of children: Not on file    Years of education: Not on file    Highest education level: Not on file   Occupational History    Not on file   Social Needs    Financial resource strain: Not on file    Food insecurity     Worry: Not on file     Inability: Not on file    Transportation needs     Medical: Not on file     Non-medical: Not on file   Tobacco Use    Smoking status: Never Smoker    Smokeless tobacco: Never Used   Substance and Sexual Activity    Alcohol use: No    Drug use: Not on file    Sexual activity: Not on file   Lifestyle    Physical activity     Days per week: Not on file     Minutes per session: Not on file    Stress: Not on file   Relationships    Social connections     Talks on phone: Not on file     Gets together: Not on file     Attends Lutheran service: Not on file     Active member of club or organization: Not on file     Attends meetings of clubs or organizations: Not on file     Relationship status: Not on file    Intimate partner violence     Fear of current or ex partner: Not on file     Emotionally abused: Not on file     Physically abused: Not on file     Forced sexual activity: Not on file   Other Topics Concern    Not on file   Social History Narrative    Not on file      Family History   Problem Relation Age of Onset    Cancer Father       Visit Vitals  Pulse 73   Temp (!) 96.2 °F (35.7 °C)   Resp 18   Ht 5' 2\" (1.575 m)   Wt 66.2 kg (146 lb)   SpO2 97%   BMI 26.70 kg/m²        Review of Systems:   A comprehensive review of systems was negative except for that written in the HPI.       Neuro Exam: Appearance: The patient is well developed, well nourished, provides a coherent history and is in no acute distress. Mental Status: Oriented to time, place and person. Mood and affect appropriate. Cranial Nerves:   Intact visual fields. Fundi are benign. ANITA, EOM's full, no nystagmus, no ptosis. Facial sensation is normal. Corneal reflexes are intact. Facial movement is symmetric. Hearing is normal bilaterally. Palate is midline with normal sternocleidomastoid and trapezius muscles are normal. Tongue is midline. Motor:  5/5 strength in upper and lower proximal and distal muscles. Normal bulk and tone. No fasciculations. Reflexes:   Deep tendon reflexes 2+/4 and symmetrical.   Sensory:   Normal to touch, pinprick and vibration. Gait:   Patient's transfers and gait are baseline for her which is slow and cautious but functional.  Has superimposed dyskinetic movements in the upper extremities and occasionally the head. Subtle flexion posturing at the neck and shoulders. Does not use DME. Tremor:   No tremor noted. Cerebellar:  No cerebellar signs present. Neurovascular:  Normal heart sounds and regular rhythm, peripheral pulses intact, and no carotid bruits. Assessment:   Parkinson's disease. We will keep her currently dosed at 2 of the Sinemet 25/100 4 times daily. Seems to be mobile at this point and the dyskinesias end up being cosmetically apparent but otherwise do not slow her down or otherwise aggravate her. My condolences on the passing of her son on Mother's Day. Stay as reasonably safely busy as possible. Fortunately lives with her daughter now so a very close eye can be maintained and will be on first alert if there is any unforeseen circumstances that need to be addressed ASAP. Plan:   Revisit 6 months. Actually enjoys being at this location so with the daughter's permission who is the , will reschedule here.   Signed by :  Cory Yanez IV MD

## 2020-07-13 NOTE — PATIENT INSTRUCTIONS
Patient history reviewed patient examined. For the time being we will keep the Sinemet dosed as is 2, 4 times a day. My sincere condolences on the passing of the son on Mother's Day. Would recommend revisit here in about 6 months and stay as reasonably and safely busy as possible.

## 2021-01-13 ENCOUNTER — OFFICE VISIT (OUTPATIENT)
Dept: NEUROLOGY | Age: 86
End: 2021-01-13
Payer: MEDICARE

## 2021-01-13 VITALS
WEIGHT: 149.9 LBS | HEIGHT: 62 IN | HEART RATE: 81 BPM | RESPIRATION RATE: 16 BRPM | OXYGEN SATURATION: 96 % | TEMPERATURE: 98.1 F | BODY MASS INDEX: 27.58 KG/M2

## 2021-01-13 DIAGNOSIS — R41.3 IMPAIRED MEMORY: ICD-10-CM

## 2021-01-13 DIAGNOSIS — G20 PARKINSON DISEASE (HCC): Primary | ICD-10-CM

## 2021-01-13 PROCEDURE — G8536 NO DOC ELDER MAL SCRN: HCPCS | Performed by: SPECIALIST

## 2021-01-13 PROCEDURE — G8427 DOCREV CUR MEDS BY ELIG CLIN: HCPCS | Performed by: SPECIALIST

## 2021-01-13 PROCEDURE — G8510 SCR DEP NEG, NO PLAN REQD: HCPCS | Performed by: SPECIALIST

## 2021-01-13 PROCEDURE — 1090F PRES/ABSN URINE INCON ASSESS: CPT | Performed by: SPECIALIST

## 2021-01-13 PROCEDURE — 99214 OFFICE O/P EST MOD 30 MIN: CPT | Performed by: SPECIALIST

## 2021-01-13 PROCEDURE — 1101F PT FALLS ASSESS-DOCD LE1/YR: CPT | Performed by: SPECIALIST

## 2021-01-13 PROCEDURE — G8419 CALC BMI OUT NRM PARAM NOF/U: HCPCS | Performed by: SPECIALIST

## 2021-01-13 RX ORDER — CARBIDOPA AND LEVODOPA 25; 100 MG/1; MG/1
TABLET ORAL
Qty: 720 TAB | Refills: 1 | Status: SHIPPED | OUTPATIENT
Start: 2021-01-13 | End: 2021-10-08 | Stop reason: SDUPTHER

## 2021-01-13 NOTE — PATIENT INSTRUCTIONS
Patient with obvious Parkinson's disease progression and according to daughter some progression of cognitive decline as well. If there is a change of mind about referral to physical and occupational therapy services just let me know. We will not change the Sinemet as it is currently dosed. Suggestions to try to avoid daytime naps so she sleeps better at night. Getting some sun during the day might help reinforce her more natural sleep-wake cycles and end daytime naps.

## 2021-01-13 NOTE — LETTER
1/13/2021 Patient: Flora Freire YOB: 1933 Date of Visit: 1/13/2021 Torrey Bernstein, [address] [address] [address] Via Fax: [phone] Dear Torrey Bernstein DO, Thank you for referring Ms. Vickey Bradshaw to [facility] for evaluation. My notes for this consultation are attached. If you have questions, please do not hesitate to call me. I look forward to following your patient along with you.  
 
 
Sincerely, 
 
Padmini Scott MD

## 2021-01-13 NOTE — PROGRESS NOTES
Neurology Consult      Subjective:      Karla Walden is a 80 y.o. female who comes in today at the [facility] office with her daughter. The daughter says they actually live closer to this office location than the [facility]. Unfortunately have had reprisals of coronavirus within the staff and extended connections. For that reason we will not be at the [address] [place] [address] location and I am not sure how long that will last.  Daughter is very keen and has noticed that her mother is having increasingly more prominent difficulties with memory and was going on around her. I told her that a good 40% of the Parkinson population can have legitimate cognitive decline. The mother does occasionally listen to music and I suggested a possible read with the [employer] large print. Was not interested in the word search game and I do not think she really keeps up with any news or similar choices to current events. She does have a tendency to nap during the day as yesterday was a 4-hour nap. Needless to say she really did not sleep last night so today she is back into a napping mode according to the daughter. Getting out and soaking up sunshine earlier in the day could reinforce the natural circadian rhythm as a goes to wakefulness and sleep. Has had no falls so far recently. The 1 exception would be where she slid out of a chair and hit her occipital area on the computer and ended up with a goose egg. She has her baseline tardive dyskinesia but has always rejected any attention to reducing it because I think she likes the animation it gives her and tends to retard the rigidity component. The daughter and patient will think about any referral to PT and OT and potentially neuropsychology for the memory evaluation. Revisit in 6 months and apparently this [facility] office location suits them even better than the [address] [place] [address] location.            Current Outpatient Medications   Medication Sig Dispense Refill  carbidopa-levodopa (Sinemet)  mg per tablet 2 po qid  Indications: Parkinson's disease 720 Tab 1    naproxen sodium (ALEVE) 220 mg cap Take  by mouth.  cyanocobalamin 1,000 mcg tablet Take 1,000 mcg by mouth daily.  raNITIdine (ZANTAC) 150 mg tablet Take 150 mg by mouth two (2) times a day.  ergocalciferol (VITAMIN D2) 50,000 unit capsule Take 50,000 Units by mouth.  aspirin 81 mg tablet Take 81 mg by mouth.           No Known Allergies  Past Medical History:   Diagnosis Date    Arthritis     Asthma     [facility]       Past Surgical History:   Procedure Laterality Date    HX HYSTERECTOMY      HX KNEE REPLACEMENT      HX MASTECTOMY        Social History     Socioeconomic History    Marital status:      Spouse name: Not on file    Number of children: Not on file    Years of education: Not on file    Highest education level: Not on file   Occupational History    Not on file   Social Needs    Financial resource strain: Not on file    Food insecurity     Worry: Not on file     Inability: Not on file    Transportation needs     Medical: Not on file     Non-medical: Not on file   Tobacco Use    Smoking status: Never Smoker    Smokeless tobacco: Never Used   Substance and Sexual Activity    Alcohol use: No    Drug use: Not on file    Sexual activity: Not on file   Lifestyle    Physical activity     Days per week: Not on file     Minutes per session: Not on file    Stress: Not on file   Relationships    Social connections     Talks on phone: Not on file     Gets together: Not on file     Attends Yazidism service: Not on file     Active member of club or organization: Not on file     Attends meetings of clubs or organizations: Not on file     Relationship status: Not on file    Intimate partner violence     Fear of current or ex partner: Not on file     Emotionally abused: Not on file     Physically abused: Not on file     Forced sexual activity: Not on file   Other Topics Concern    Not on file   Social History Narrative    Not on file      Family History   Problem Relation Age of Onset    Cancer Father       Visit Vitals  Pulse 81   Temp 98.1 °F (36.7 °C) (Temporal)   Resp 16   Ht 5' 2\" (1.575 m)   Wt 68 kg (149 lb 14.4 oz)   SpO2 96%   BMI 27.42 kg/m²        Review of Systems:   A comprehensive review of systems was negative except for that written in the HPI. Neuro Exam:     Appearance: The patient is well developed, well nourished, and unable to provide a coherent history and is in no acute distress. Mental Status: Oriented to month and year but could not give me the day or the date. Mood and affect appropriate. Cranial Nerves:   Intact visual fields. Fundi are benign. ANITA, EOM's full, no nystagmus, no ptosis. Facial sensation is normal. Corneal reflexes are intact. Facial movement is symmetric. Hearing is normal bilaterally. Palate is midline with normal sternocleidomastoid and trapezius muscles are normal. Tongue is midline. Motor:  5/5 strength in upper and lower proximal and distal muscles. Normal bulk and tone. No fasciculations. Reflexes:   Deep tendon reflexes 2+/4 and symmetrical.   Sensory:   Normal to touch, pinprick and vibration. Gait:   Transfers and gait unassisted taken slow and cautious but functional and has an element of stooped posturing diminished arm swing and looks like she is in control all things considered. Tremor:   No tremor noted. Did have her usual agenda with tardive dyskinesia with random movements of the head and the upper extremities especially. Did not detect any legitimate rigidity today and it was hard to discern a clear-cut resting type tremor etc.   Cerebellar:  No cerebellar signs present. Neurovascular:  Normal heart sounds and regular rhythm, peripheral pulses intact, and no carotid bruits. Assessment:   Problem 1 Parkinson's disease.   We will suggest continuation of the Sinemet as it is and does have an element of tar dive dyskinesia but I think the patient is always like the enhanced movement capabilities as opposed to bradykinesia and rigidity. Offered patient and family the services of a referral to PT and OT but for the time being they just want to think about it. Problem 2 memory concerns. Informed the daughter that up to 40% of the patient population with Parkinson's can have legitimate cognitive declines. At this time will think about any further investigation as a goes to neuropsych testing etc.        Plan:   Revisit 6 months.   Signed by :  Harish Hernadez MD

## 2021-03-19 ENCOUNTER — TRANSCRIBE ORDER (OUTPATIENT)
Dept: SCHEDULING | Age: 86
End: 2021-03-19

## 2021-03-19 DIAGNOSIS — M54.50 ACUTE BILATERAL LOW BACK PAIN: Primary | ICD-10-CM

## 2021-03-19 DIAGNOSIS — G20 PARKINSON'S DISEASE (HCC): ICD-10-CM

## 2021-03-19 DIAGNOSIS — M81.0 OSTEOPOROSIS: ICD-10-CM

## 2021-03-29 ENCOUNTER — TRANSCRIBE ORDER (OUTPATIENT)
Dept: GENERAL RADIOLOGY | Age: 86
End: 2021-03-29

## 2021-03-29 ENCOUNTER — HOSPITAL ENCOUNTER (OUTPATIENT)
Dept: GENERAL RADIOLOGY | Age: 86
Discharge: HOME | End: 2021-03-29
Attending: PHYSICAL MEDICINE & REHABILITATION
Payer: MEDICARE

## 2021-03-29 ENCOUNTER — HOSPITAL ENCOUNTER (OUTPATIENT)
Dept: MRI IMAGING | Age: 86
Discharge: HOME | End: 2021-03-29
Attending: PHYSICAL MEDICINE & REHABILITATION
Payer: MEDICARE

## 2021-03-29 DIAGNOSIS — M81.0 OSTEOPOROSIS: ICD-10-CM

## 2021-03-29 DIAGNOSIS — G20 PARKINSON'S DISEASE (HCC): ICD-10-CM

## 2021-03-29 DIAGNOSIS — M54.50 ACUTE LOW BACK PAIN: ICD-10-CM

## 2021-03-29 DIAGNOSIS — M54.50 ACUTE BILATERAL LOW BACK PAIN: Primary | ICD-10-CM

## 2021-03-29 DIAGNOSIS — M54.50 ACUTE BILATERAL LOW BACK PAIN: ICD-10-CM

## 2021-03-29 PROCEDURE — 72148 MRI LUMBAR SPINE W/O DYE: CPT

## 2021-03-29 PROCEDURE — 72110 X-RAY EXAM L-2 SPINE 4/>VWS: CPT

## 2021-07-15 ENCOUNTER — OFFICE VISIT (OUTPATIENT)
Dept: NEUROLOGY | Age: 86
End: 2021-07-15
Payer: MEDICARE

## 2021-07-15 DIAGNOSIS — G89.29 CHRONIC BACK PAIN GREATER THAN 3 MONTHS DURATION: ICD-10-CM

## 2021-07-15 DIAGNOSIS — G20 PARKINSON'S DISEASE (HCC): Primary | ICD-10-CM

## 2021-07-15 DIAGNOSIS — M54.9 CHRONIC BACK PAIN GREATER THAN 3 MONTHS DURATION: ICD-10-CM

## 2021-07-15 PROCEDURE — G8536 NO DOC ELDER MAL SCRN: HCPCS | Performed by: SPECIALIST

## 2021-07-15 PROCEDURE — G8510 SCR DEP NEG, NO PLAN REQD: HCPCS | Performed by: SPECIALIST

## 2021-07-15 PROCEDURE — 99214 OFFICE O/P EST MOD 30 MIN: CPT | Performed by: SPECIALIST

## 2021-07-15 PROCEDURE — G8419 CALC BMI OUT NRM PARAM NOF/U: HCPCS | Performed by: SPECIALIST

## 2021-07-15 PROCEDURE — 1101F PT FALLS ASSESS-DOCD LE1/YR: CPT | Performed by: SPECIALIST

## 2021-07-15 PROCEDURE — 1090F PRES/ABSN URINE INCON ASSESS: CPT | Performed by: SPECIALIST

## 2021-07-15 PROCEDURE — G8427 DOCREV CUR MEDS BY ELIG CLIN: HCPCS | Performed by: SPECIALIST

## 2021-07-15 NOTE — PATIENT INSTRUCTIONS
Patient history reviewed patient examined. Will refer to pain management group of [place] [facility]. Am hoping for a better outcome with back pain management at this point. Will offer her low-dose gabapentin to be used as needed and she was warned of sedation. Further suggestions may follow. Revisit here in about 6 months for Parkinson's.

## 2021-07-15 NOTE — LETTER
7/17/2021    Patient: Shonna Zimmerman   YOB: 1933   Date of Visit: 7/15/2021     Tomás Galicia, [address] [facility] [address]  Via Fax: [phone]    Dear Tomás Galicia DO,      Thank you for referring Ms. Neil Justin to [facility] for evaluation. My notes for this consultation are attached. If you have questions, please do not hesitate to call me. I look forward to following your patient along with you.       Sincerely,    Alexey Samuel MD

## 2021-07-15 NOTE — PROGRESS NOTES
Neurology Consult      Subjective:      Dejah Ibarra is a 80 y.o. female who comes in today with her daughter. Has established Parkinson's on Sinemet 25/100 immediate release taking 2 4 times daily. Has an element of breakthrough tardive dyskinesia but has never complained nor wanted any medicine to check this feature with her treatment. Unfortunately has steady-state chronic back pain for months and the MRI disclosed multilevel degenerative joint and disc disease from C2-3 through L5-S1. Tried pain management but unfortunately did not get very far with intervention and so we are looking to a different interventional group. Have decided ongoing with [facility] and will make the referral accordingly. Will issue gabapentin 100 mg 3 times daily as needed and patient and daughter were warned about sedation possibilities. Also this issue becomes inherently more magnified when combined with other medicines that can cause sedation alcohol etc.  She no longer drives. Current Outpatient Medications   Medication Sig Dispense Refill    doxylamine succinate (UNISOM) 25 mg tablet Take 25 mg by mouth nightly as needed.  carbidopa-levodopa (Sinemet)  mg per tablet 2 po qid  Indications: Parkinson's disease 720 Tab 1    naproxen sodium (ALEVE) 220 mg cap Take  by mouth.  cyanocobalamin 1,000 mcg tablet Take 1,000 mcg by mouth daily. (Patient not taking: Reported on 7/15/2021)      raNITIdine (ZANTAC) 150 mg tablet Take 150 mg by mouth two (2) times a day. (Patient not taking: Reported on 7/15/2021)      ergocalciferol (VITAMIN D2) 50,000 unit capsule Take 50,000 Units by mouth. (Patient not taking: Reported on 7/15/2021)      aspirin 81 mg tablet Take 81 mg by mouth.    (Patient not taking: Reported on 7/15/2021)        No Known Allergies  Past Medical History:   Diagnosis Date    Arthritis     Asthma     Breast cancer [facility])       Past Surgical History:   Procedure Laterality Date    HX HYSTERECTOMY      HX KNEE REPLACEMENT      HX MASTECTOMY        Social History     Socioeconomic History    Marital status:      Spouse name: Not on file    Number of children: Not on file    Years of education: Not on file    Highest education level: Not on file   Occupational History    Not on file   Tobacco Use    Smoking status: Never Smoker    Smokeless tobacco: Never Used   Substance and Sexual Activity    Alcohol use: No    Drug use: Not on file    Sexual activity: Not on file   Other Topics Concern    Not on file   Social History Narrative    Not on file     Social Determinants of Health     Financial Resource Strain:     Difficulty of Paying Living Expenses:    Food Insecurity:     Worried About Running Out of Food in the Last Year:     [address] in the Last Year:    Transportation Needs:     Lack of Transportation (Medical):  Lack of Transportation (Non-Medical):    Physical Activity:     Days of Exercise per Week:     Minutes of Exercise per Session:    Stress:     Feeling of Stress :    Social Connections:     Frequency of Communication with Friends and Family:     Frequency of Social Gatherings with Friends and Family:     Attends Sikh Services:     Active Member of Clubs or Organizations:     Attends Club or Organization Meetings:     Marital Status:    Intimate Partner Violence:     Fear of Current or Ex-Partner:     Emotionally Abused:     Physically Abused:     Sexually Abused:       Family History   Problem Relation Age of Onset    Cancer Father       There were no vitals taken for this visit. Review of Systems:   A comprehensive review of systems was negative except for that written in the HPI. Neuro Exam:     Appearance: The patient is well developed, well nourished, provides a partial coherent history and is in no acute distress. Mental Status: Oriented to time, place and person. Mood and affect appropriate.    Cranial Nerves:   Intact visual fields. Fundi are benign. ANITA, EOM's full, no nystagmus, no ptosis. Facial sensation is normal. Corneal reflexes are intact. Facial movement is symmetric. Hearing is normal bilaterally. Palate is midline with normal sternocleidomastoid and trapezius muscles are normal. Tongue is midline. Motor:  5/5 strength in upper and lower proximal and distal muscles. Normal bulk and tone. No fasciculations. Has manifest bradykinesia and occasional tardive dyskinesia traits. No rate limiting rigidity noteworthy. Reflexes:   Deep tendon reflexes 1-2+/4 and symmetrical.   Sensory:   Normal to touch, pinprick and vibration. Gait:   Transfers and gait taken slow and cautious but functional.  Uses a wheeled walker. Tremor:   No tremor noted. Cerebellar:  No cerebellar signs present. Neurovascular:  Normal heart sounds and regular rhythm, peripheral pulses intact, and no carotid bruits. Assessment:   Problem 1 Parkinson's disease. We will continue on the Sinemet as previously ordered immediate release 25/100 2, 4 times daily. Neuropathic pain. Chronic low back pain will refer to [facility] and will issue gabapentin 100 mg 3 times daily as needed and she was warned of sedation. Further suggestions could follow. Plan:   Revisit 6 months.   Signed by :  Renetta Mckeon MD

## 2021-09-22 ENCOUNTER — TELEPHONE (OUTPATIENT)
Dept: NEUROLOGY | Age: 86
End: 2021-09-22

## 2021-09-22 NOTE — TELEPHONE ENCOUNTER
Aileen,sounds like she could be a referral to a different pain specialist which I can approve if patient interested.  [email or web address]

## 2021-09-22 NOTE — TELEPHONE ENCOUNTER
----- Message from Antonette Rdz sent at 9/22/2021  9:05 AM EDT -----  Regarding: //Telephone  Patient return call    Caller's first and last name and relationship (if not the patient): Ariela Garcia contact number(s): [phone]      Whose call is being returned: Nurse      Details to clarify the request: Pt daughter, Valeria Caputo,  is requesting to increase dosage of gabapentin 100 mg.  Pt is asking for it more frequently      Antonette Rdz

## 2021-09-22 NOTE — TELEPHONE ENCOUNTER
Duplicate call from yesterday:    \"Was referred to [facility] at 7/15/ visit. Outgoing call to daughter- NO answer and No VM     Home # is out of service. \"    Return call to daughter - Reports Pt saw Dr Blaise Young at [facility] spine 3 times over the summer- he gave her hydrocodone for 30 days which she has completed and states Dr Felipe Chavez was \"going to do a procedure that mom couldn't do because of her Parkinson's that the idiot ordered\" and daughter state they will not go back to him. Would like Dr Alexandria Cuellar to increase pt's Gabapentin as pt is requesting it every 2 hours.

## 2021-09-24 DIAGNOSIS — M79.2 NEUROPATHIC PAIN: Primary | ICD-10-CM

## 2021-09-24 RX ORDER — GABAPENTIN 300 MG/1
CAPSULE ORAL
Qty: 90 CAPSULE | Refills: 2 | Status: SHIPPED | OUTPATIENT
Start: 2021-09-24

## 2021-09-24 NOTE — PATIENT INSTRUCTIONS
[facility]   Statement to Patients  April 1, 2014      In an effort to ensure the large volume of patient prescription refills is processed in the most efficient and expeditious manner, we are asking our patients to assist us by calling your Pharmacy for all prescription refills, this will include also your  Mail Order Pharmacy. The pharmacy will contact our office electronically to continue the refill process. Please do not wait until the last minute to call your pharmacy. We need at least 48 hours (2days) to fill prescriptions. We also encourage you to call your pharmacy before going to  your prescription to make sure it is ready. With regard to controlled substance prescription refill requests (narcotic refills) that need to be picked up at our office, we ask your cooperation by providing us with at least 72 hours (3days) notice that you will need a refill. We will not refill narcotic prescription refill requests after 4:00pm on any weekday, Monday through Thursday, or after 2:00pm on Fridays, or on the weekends. We encourage everyone to explore another way of getting your prescription refill request processed using [employer], our patient web portal through our electronic medical record system. [employer] is an efficient and effective way to communicate your medication request directly to the office and  downloadable as an ifeoma on your smart phone . [employer] also features a review functionality that allows you to view your medication list as well as leave messages for your physician. Are you ready to get connected? If so please review the attatched instructions or speak to any of our staff to get you set up right away! Thank you so much for your cooperation. Should you have any questions please contact our Practice Administrator.     The Physicians and Staff,  [facility] [address]  What is a living will? A living will is a legal form you use to write down the kind of care you want at the end of your life. It is used by the health professionals who will treat you if you aren't able to decide for yourself. If you put your wishes in writing, your loved ones and others will know what kind of care you want. They won't need to guess. This can ease your mind and be helpful to others. A living will is not the same as an estate or property will. An estate will explains what you want to happen with your money and property after you die. Is a living will a legal document? A living will is a legal document. Each state has its own laws about living kc. If you move to another state, make sure that your living will is legal in the state where you now live. Or you might use a universal form that has been approved by many states. This kind of form can sometimes be completed and stored online. Your electronic copy will then be available wherever you have a connection to the Internet. In most cases, doctors will respect your wishes even if you have a form from a different state. · You don't need an  to complete a living will. But legal advice can be helpful if your state's laws are unclear, your health history is complicated, or your family can't agree on what should be in your living will. · You can change your living will at any time. Some people find that their wishes about end-of-life care change as their health changes. · In addition to making a living will, think about completing a medical power of  form. This form lets you name the person you want to make end-of-life treatment decisions for you (your \"health care agent\") if you're not able to. Many hospitals and nursing homes will give you the forms you need to complete a living will and a medical power of . · Your living will is used only if you can't make or communicate decisions for yourself anymore.  If you become able to make decisions again, you can accept or refuse any treatment, no matter what you wrote in your living will. · Your state may offer an online registry. This is a place where you can store your living will online so the doctors and nurses who need to treat you can find it right away. What should you think about when creating a living will? Talk about your end-of-life wishes with your family members and your doctor. Let them know what you want. That way the people making decisions for you won't be surprised by your choices. Think about these questions as you make your living will:  · Do you know enough about life support methods that might be used? If not, talk to your doctor so you know what might be done if you can't breathe on your own, your heart stops, or you're unable to swallow. · What things would you still want to be able to do after you receive life-support methods? Would you want to be able to walk? To speak? To eat on your own? To live without the help of machines? · If you have a choice, where do you want to be cared for? In your home? At a hospital or nursing home? · Do you want certain Sabianism practices performed if you become very ill? · If you have a choice at the end of your life, where would you prefer to die? At home? In a hospital or nursing home? Somewhere else? · Would you prefer to be buried or cremated? · Do you want your organs to be donated after you die? What should you do with your living will? · Make sure that your family members and your health care agent have copies of your living will. · Give your doctor a copy of your living will to keep in your medical record. If you have more than one doctor, make sure that each one has a copy. · You may want to put a copy of your living will where it can be easily found. Where can you learn more? Go to http://mychal-lily.info/.   Enter P014 in the search box to learn more about \"Learning About Living New.\"  Current as of: September 24, 2016  Content Version: 11.4  © 8377-4610 [employer]. Care instructions adapted under license by [employer] (which disclaims liability or warranty for this information). If you have questions about a medical condition or this instruction, always ask your healthcare professional. [address] any warranty or liability for your use of this information. Patient history reviewed and examined. Will increase sinemet to 2 4x a day. Can use extended release if she finds symptoms awaken her at night. RV in 6 months. Unknown

## 2021-10-08 RX ORDER — CARBIDOPA AND LEVODOPA 25; 100 MG/1; MG/1
TABLET ORAL
Qty: 720 TABLET | Refills: 1 | Status: SHIPPED | OUTPATIENT
Start: 2021-10-08

## 2021-10-08 NOTE — TELEPHONE ENCOUNTER
----- Message from Fiona Leonardo sent at 10/8/2021 10:46 AM EDT -----  Regarding: /telephone  Medication Refill    Caller (if not patient):self      Relationship of caller (if not patient):n/a      Best contact number(s): [phone]      Name of medication and dosage if known: Careidopa  mg      Is patient out of this medication (yes/no): yes      Pharmacy name:[facility]    Pharmacy listed in chart? (yes/no) no  Pharmacy phone number: [phone]      Details to clarify the request: Pt is currently out of medication and will need a refill as soon as possible.       Fiona eLonardo

## 2023-05-11 RX ORDER — GABAPENTIN 300 MG/1
CAPSULE ORAL
COMMUNITY
Start: 2021-09-24

## 2023-05-11 RX ORDER — RANITIDINE 150 MG/1
TABLET ORAL 2 TIMES DAILY
COMMUNITY

## 2023-05-11 RX ORDER — COVID-19 ANTIGEN TEST
KIT MISCELLANEOUS
COMMUNITY

## 2023-05-11 RX ORDER — ERGOCALCIFEROL 1.25 MG/1
CAPSULE ORAL
COMMUNITY